# Patient Record
Sex: MALE
[De-identification: names, ages, dates, MRNs, and addresses within clinical notes are randomized per-mention and may not be internally consistent; named-entity substitution may affect disease eponyms.]

---

## 2024-09-23 ENCOUNTER — NON-APPOINTMENT (OUTPATIENT)
Age: 59
End: 2024-09-23

## 2024-09-24 ENCOUNTER — APPOINTMENT (OUTPATIENT)
Dept: OTOLARYNGOLOGY | Facility: CLINIC | Age: 59
End: 2024-09-24
Payer: COMMERCIAL

## 2024-09-24 VITALS — BODY MASS INDEX: 25.9 KG/M2 | WEIGHT: 165 LBS | HEIGHT: 67 IN

## 2024-09-24 DIAGNOSIS — J33.9 NASAL POLYP, UNSPECIFIED: ICD-10-CM

## 2024-09-24 DIAGNOSIS — R04.0 EPISTAXIS: ICD-10-CM

## 2024-09-24 DIAGNOSIS — J34.2 DEVIATED NASAL SEPTUM: ICD-10-CM

## 2024-09-24 PROBLEM — Z00.00 ENCOUNTER FOR PREVENTIVE HEALTH EXAMINATION: Status: ACTIVE | Noted: 2024-09-24

## 2024-09-24 PROCEDURE — 31231 NASAL ENDOSCOPY DX: CPT | Mod: 52

## 2024-09-24 PROCEDURE — 99202 OFFICE O/P NEW SF 15 MIN: CPT | Mod: 25

## 2024-09-24 PROCEDURE — 99204 OFFICE O/P NEW MOD 45 MIN: CPT | Mod: 25

## 2024-09-25 PROBLEM — J34.2 DEVIATED NASAL SEPTUM: Status: ACTIVE | Noted: 2024-09-25

## 2024-09-25 PROBLEM — R04.0 EPISTAXIS: Status: ACTIVE | Noted: 2024-09-25

## 2024-09-25 NOTE — PROCEDURE
[FreeTextEntry6] : R septal deviation  Polypoid mucosa  ....   [FreeTextEntry3] : Nasal Endoscopy: right anterior septal deflection obstructing thick scab and clot in left nasal cavity - removed 1cm midseptal perforation e/o prior ESS w patent MM and limited ethmoid beds; polypoid tissue/polyps within ethmoid w frontoethmoid occlusion no obvious mucosal lesions or source of epistaxis

## 2024-09-25 NOTE — HISTORY OF PRESENT ILLNESS
[de-identified] : 58M here for initial evaluation  He c/o recurrent nosebleeds for the past 1 month. Bleeding is from the front of nose and mainly left sided. Episodes last about 10 minutes and stop with pressure. He states he had cautery performed 2 weeks ago which helped for about 3 days before he began bleeding again. This has not happened to him in the past. He has h/o ESS in 2023 and was told he has polyps; at that time, he c/o congestion and anosmia which he feels has improved since his surgery. He still c/o right sided nasal obstruction.  He recently had allergy testing and told he has multiple allergies; he was also recommended dupixent but he has no idea what that is.  No recent imaging; most recent CT is from 4/2022 - shows moderate pansinus polypoid mucosal thickening  ROS otherwise unremarkable

## 2024-09-25 NOTE — ASSESSMENT
[FreeTextEntry1] : 58M here for initial evaluation He c/o left epistaxis for the past 1 month. Bleeding is from the front of nose and mainly left sided, lasting around 10 minutes each time and stopping with pressure. Cautery done 2 weeks ago did not help at all. He has h/o ESS in 2023 and was told he has polyps; at that time, he c/o congestion and anosmia which he feels has improved since his surgery. He still c/o right sided nasal obstruction. He recently had allergy testing and told he has multiple allergies; he was also recommended dupixent but he has no idea what that is. There is no recent imaging; there is a CT is from 4/2022 which shows moderate pansinus polypoid mucosal thickening. On exam, nasal endoscopy shows right anterior septal deflection and obstructing thick scab and clot in left nasal cavity which was debrided; there is a 1cm midseptal perforation and e/o prior ESS w patent MM and limited ethmoid beds and polypoid tissue/polyps within both ethmoids w frontoethmoid occlusion, but no obvious mucosal lesions or source of epistaxis. Not sure of source of recent left sided epistaxis; he has nasal polyposis and h/o prior [facility] FESS and there is polyp regrowth and he is not rinsing regularly. Perhaps epistaxis from his midseptal perforation? regardless, I am sure his baseline could be better - for now, to start budesonide rinses. Will get CT sinus for baseline and RTO thereafter for review, or sooner should epistaxis recur.

## 2024-09-25 NOTE — HISTORY OF PRESENT ILLNESS
[de-identified] : 58M here for initial evaluation  He c/o recurrent nosebleeds for the past 1 month. Bleeding is from the front of nose and mainly left sided. Episodes last about 10 minutes and stop with pressure. He states he had cautery performed 2 weeks ago which helped for about 3 days before he began bleeding again. This has not happened to him in the past. He has h/o ESS in 2023 and was told he has polyps; at that time, he c/o congestion and anosmia which he feels has improved since his surgery. He still c/o right sided nasal obstruction.  He recently had allergy testing and told he has multiple allergies; he was also recommended dupixent but he has no idea what that is.  No recent imaging; most recent CT is from 4/2022 - shows moderate pansinus polypoid mucosal thickening  ROS otherwise unremarkable

## 2024-10-29 ENCOUNTER — APPOINTMENT (OUTPATIENT)
Dept: OTOLARYNGOLOGY | Facility: CLINIC | Age: 59
End: 2024-10-29

## 2024-10-29 VITALS — BODY MASS INDEX: 25.9 KG/M2 | WEIGHT: 165 LBS | HEIGHT: 67 IN

## 2024-11-14 ENCOUNTER — APPOINTMENT (OUTPATIENT)
Dept: OTOLARYNGOLOGY | Facility: CLINIC | Age: 59
End: 2024-11-14
Payer: COMMERCIAL

## 2024-11-14 VITALS — WEIGHT: 165 LBS | HEIGHT: 67 IN | BODY MASS INDEX: 25.9 KG/M2

## 2024-11-14 DIAGNOSIS — R04.0 EPISTAXIS: ICD-10-CM

## 2024-11-14 DIAGNOSIS — J34.2 DEVIATED NASAL SEPTUM: ICD-10-CM

## 2024-11-14 DIAGNOSIS — J33.9 NASAL POLYP, UNSPECIFIED: ICD-10-CM

## 2024-11-14 PROCEDURE — 99213 OFFICE O/P EST LOW 20 MIN: CPT | Mod: 25

## 2024-11-14 PROCEDURE — 31231 NASAL ENDOSCOPY DX: CPT | Mod: 52

## 2024-12-20 RX ORDER — BUDESONIDE 0.5 MG/2ML
0.5 INHALANT ORAL
Qty: 6 | Refills: 2 | Status: ACTIVE | COMMUNITY
Start: 2024-12-20 | End: 1900-01-01